# Patient Record
Sex: FEMALE | Race: WHITE | NOT HISPANIC OR LATINO | Employment: STUDENT | URBAN - METROPOLITAN AREA
[De-identification: names, ages, dates, MRNs, and addresses within clinical notes are randomized per-mention and may not be internally consistent; named-entity substitution may affect disease eponyms.]

---
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
## 2024-02-09 ENCOUNTER — OFFICE VISIT (OUTPATIENT)
Dept: URGENT CARE | Facility: CLINIC | Age: 19
End: 2024-02-09
Payer: COMMERCIAL

## 2024-02-09 VITALS
DIASTOLIC BLOOD PRESSURE: 69 MMHG | RESPIRATION RATE: 18 BRPM | TEMPERATURE: 97.5 F | SYSTOLIC BLOOD PRESSURE: 116 MMHG | HEART RATE: 52 BPM | OXYGEN SATURATION: 98 %

## 2024-02-09 DIAGNOSIS — Z23 ENCOUNTER FOR IMMUNIZATION: ICD-10-CM

## 2024-02-09 DIAGNOSIS — S69.91XA INJURY OF NAIL BED OF FINGER OF RIGHT HAND, INITIAL ENCOUNTER: Primary | ICD-10-CM

## 2024-02-09 PROCEDURE — 99213 OFFICE O/P EST LOW 20 MIN: CPT | Performed by: STUDENT IN AN ORGANIZED HEALTH CARE EDUCATION/TRAINING PROGRAM

## 2024-02-09 RX ORDER — SULFAMETHOXAZOLE AND TRIMETHOPRIM 800; 160 MG/1; MG/1
TABLET ORAL
COMMUNITY
Start: 2024-02-07

## 2024-02-09 NOTE — PROGRESS NOTES
[facility]        NAME: Azalea Go is a 18 y.o. female  : 2005    MRN: [id number]  DATE: 2024  TIME: 10:15 AM    Assessment and Orders   Injury of nail bed of finger of right hand, initial encounter [S69.91XA]  1. Injury of nail bed of finger of right hand, initial encounter        2. Encounter for immunization  CANCELED: Tdap Vaccine greater than or equal to 8yo            Plan and Discussion      Natural nail was loose but still well attached at the  nail bed. No lacerations noted.  Acyclic nail was unable to be removed, but was trimmed down. Patient currently on Bactrim (started yesterday) for UTI, which should cover any skin infections from developing. Finger nail was wrapped. Finger put in splint for protection. Should keep finger wrapped until nail grows out or is no longer loose.     Tdap declined.       Discussed ED precautions including (but not limited to)  Difficultly breathing or shortness of breath  Chest pain  Acutely worsening symptoms.     Risks and benefits discussed. Patient understands and agrees with the plan.     Follow up with PCP.     Chief Complaint     Chief Complaint   Patient presents with    Hand Pain     Right middle finger. Patient is wearing acrylic nail which were done 2 weeks ago. On wed while opening  slipped and nail popped and increased pain. Has worsened since with swelling and drainage clear.          History of Present Illness       Hand Pain         Patient is a 17 yo F who presents with right middle finger pain. Started yesterday when she hit her long acyclic. She felt a pop with pain. She later noticed that the nail was very loose and slightly bleeding. Hurting worse today. Can bend the DIP but states it is difficult because of swelling.     Review of Systems   Review of Systems   Skin:  Positive for color change and wound.         Current Medications       Current Outpatient Medications:     sulfamethoxazole-trimethoprim (BACTRIM  DS) 800-160 mg per tablet, TAKE 1 TABLET BY MOUTH TWICE DAILY FOR 7 DAYS, Disp: , Rfl:     prochlorperazine (COMPAZINE) 5 mg tablet, TAKE 1 TABLET BY MOUTH EVERY 8 HOURS AS NEEDED FOR NAUSEA OR VOMITING (Patient not taking: Reported on 2/9/2024), Disp: , Rfl:     SUMAtriptan (IMITREX) 50 mg tablet, Take 50 mg by mouth (Patient not taking: Reported on 2/9/2024), Disp: , Rfl:     Current Allergies     Allergies as of 02/09/2024 - Reviewed 02/09/2024   Allergen Reaction Noted    Penicillins Hives 12/21/2015            The following portions of the patient's history were reviewed and updated as appropriate: allergies, current medications, past family history, past medical history, past social history, past surgical history and problem list.     No past medical history on file.    Past Surgical History:   Procedure Laterality Date    RHINOPLASTY  2020       No family history on file.      Medications have been verified.        Objective   /69   Pulse (!) 52   Temp 97.5 °F (36.4 °C) (Temporal)   Resp 18   LMP 01/26/2024   SpO2 98%   Patient's last menstrual period was 01/26/2024.       Physical Exam     Physical Exam  Constitutional:       General: She is not in acute distress.  Pulmonary:      Effort: No respiratory distress.   Musculoskeletal:         General: Swelling, tenderness and signs of injury present.        Hands:    Skin:     Findings: Erythema present.   Neurological:      General: No focal deficit present.      Mental Status: She is alert and oriented to person, place, and time.   Psychiatric:         Mood and Affect: Mood normal.         Behavior: Behavior normal.               Gemini Anne DO

## 2025-06-25 ENCOUNTER — OFFICE VISIT (OUTPATIENT)
Dept: FAMILY MEDICINE CLINIC | Facility: CLINIC | Age: 20
End: 2025-06-25
Payer: COMMERCIAL

## 2025-06-25 VITALS
RESPIRATION RATE: 18 BRPM | SYSTOLIC BLOOD PRESSURE: 110 MMHG | BODY MASS INDEX: 21.69 KG/M2 | WEIGHT: 135 LBS | TEMPERATURE: 97.4 F | HEART RATE: 85 BPM | HEIGHT: 66 IN | DIASTOLIC BLOOD PRESSURE: 70 MMHG | OXYGEN SATURATION: 98 %

## 2025-06-25 DIAGNOSIS — Z13.6 SCREENING FOR CARDIOVASCULAR CONDITION: ICD-10-CM

## 2025-06-25 DIAGNOSIS — Z13.29 SCREENING FOR THYROID DISORDER: ICD-10-CM

## 2025-06-25 DIAGNOSIS — R10.9 ABDOMINAL WALL PAIN: Primary | ICD-10-CM

## 2025-06-25 DIAGNOSIS — G43.109 MIGRAINE WITH AURA AND WITHOUT STATUS MIGRAINOSUS, NOT INTRACTABLE: ICD-10-CM

## 2025-06-25 PROBLEM — R42 DIZZINESS: Status: RESOLVED | Noted: 2023-09-18 | Resolved: 2025-06-25

## 2025-06-25 PROBLEM — J30.89 NON-SEASONAL ALLERGIC RHINITIS: Status: RESOLVED | Noted: 2023-09-18 | Resolved: 2025-06-25

## 2025-06-25 PROBLEM — H92.03 OTALGIA, BILATERAL: Status: RESOLVED | Noted: 2023-09-18 | Resolved: 2025-06-25

## 2025-06-25 PROCEDURE — 99213 OFFICE O/P EST LOW 20 MIN: CPT | Performed by: NURSE PRACTITIONER

## 2025-06-25 NOTE — PROGRESS NOTES
"Name: Azalea Go      : 2005      MRN: [id number]  Encounter Provider: BUSTER Chin  Encounter Date: 2025   Encounter department: [facility]  :  Assessment & Plan  Abdominal wall pain  We will check rib x-ray to rule out any defect related to the rib.  If negative, advised to schedule abdominal wall ultrasound to rule out soft tissue mass versus other soft tissue deformity.  This is likely muscular pain that is rubbing with chest wall expansion  Orders:    XR ribs right w pa chest min 3 views; Future    US abdominal wall; Future    Migraine with aura and without status migrainosus, not intractable  Has had neurowork-up in the past, remains controlled with Excedrin as needed         Screening for cardiovascular condition    Orders:    CBC and differential; Future    Comprehensive metabolic panel; Future    Lipid panel; Future    Screening for thyroid disorder    Orders:    TSH, 3rd generation with Free T4 reflex; Future           History of Present Illness   New patient here today to establish care.  No significant past medical history aside from migraine headaches.  Has these approximately once per month, triggered by her menstrual cycle and stress.  Takes Excedrin, which helps.  She did see a pediatric neurologist at 1 point for these headaches, as they are associated with auras.  She is concerned about a pain that she is having near her right upper abdomen.  She used to vape, unsure if this is related. Ongoing for the past year.   Hurts with movement.          Review of Systems   Constitutional: Negative.    Respiratory: Negative.     Cardiovascular: Negative.    Gastrointestinal: Negative.    Musculoskeletal:         See HPI   Neurological: Negative.        Objective   /70   Pulse 85   Temp (!) 97.4 °F (36.3 °C)   Resp 18   Ht 5' 6\" (1.676 m)   Wt 61.2 kg (135 lb)   LMP 2025 (Approximate)   SpO2 98%   BMI 21.79 kg/m²      Physical Exam  Vitals and " nursing note reviewed.   Constitutional:       General: She is not in acute distress.     Appearance: Normal appearance.   HENT:      Head: Normocephalic and atraumatic.     Eyes:      Conjunctiva/sclera: Conjunctivae normal.     Neck:      Vascular: No carotid bruit.     Cardiovascular:      Rate and Rhythm: Normal rate and regular rhythm.      Pulses: Normal pulses.      Heart sounds: Normal heart sounds. No murmur heard.  Pulmonary:      Effort: Pulmonary effort is normal.      Breath sounds: Normal breath sounds.   Abdominal:      General: There is no distension.      Palpations: Abdomen is soft.      Tenderness: There is abdominal tenderness.      Comments: Popping sensation over RUQ just below rib cage in area pt reports tenderness     Skin:     General: Skin is warm and dry.     Neurological:      Mental Status: She is alert.     Psychiatric:         Mood and Affect: Mood normal.         Behavior: Behavior normal.

## 2025-07-23 ENCOUNTER — APPOINTMENT (OUTPATIENT)
Dept: RADIOLOGY | Facility: CLINIC | Age: 20
End: 2025-07-23
Payer: COMMERCIAL

## 2025-07-23 DIAGNOSIS — R10.9 ABDOMINAL WALL PAIN: ICD-10-CM

## 2025-07-23 PROCEDURE — 71101 X-RAY EXAM UNILAT RIBS/CHEST: CPT
